# Patient Record
Sex: FEMALE | Race: WHITE | NOT HISPANIC OR LATINO | Employment: FULL TIME | URBAN - METROPOLITAN AREA
[De-identification: names, ages, dates, MRNs, and addresses within clinical notes are randomized per-mention and may not be internally consistent; named-entity substitution may affect disease eponyms.]

---

## 2019-11-30 ENCOUNTER — OFFICE VISIT (OUTPATIENT)
Dept: URGENT CARE | Facility: URGENT CARE | Age: 29
End: 2019-11-30
Payer: COMMERCIAL

## 2019-11-30 VITALS
OXYGEN SATURATION: 100 % | HEART RATE: 81 BPM | TEMPERATURE: 98.3 F | SYSTOLIC BLOOD PRESSURE: 138 MMHG | DIASTOLIC BLOOD PRESSURE: 60 MMHG | WEIGHT: 130 LBS

## 2019-11-30 DIAGNOSIS — Z32.01 PREGNANCY TEST POSITIVE: Primary | ICD-10-CM

## 2019-11-30 PROCEDURE — 99207 ZZC NO BILLABLE SERVICE THIS VISIT: CPT | Performed by: FAMILY MEDICINE

## 2019-11-30 SDOH — HEALTH STABILITY: MENTAL HEALTH: HOW MANY STANDARD DRINKS CONTAINING ALCOHOL DO YOU HAVE ON A TYPICAL DAY?: 1 OR 2

## 2019-11-30 SDOH — HEALTH STABILITY: MENTAL HEALTH: HOW OFTEN DO YOU HAVE 6 OR MORE DRINKS ON ONE OCCASION?: NEVER

## 2019-11-30 SDOH — HEALTH STABILITY: MENTAL HEALTH: HOW OFTEN DO YOU HAVE A DRINK CONTAINING ALCOHOL?: 2-4 TIMES A MONTH

## 2019-11-30 NOTE — PROGRESS NOTES
S:  Pt has had 3 pregnancy tests which were positive at home earlier today.    Initially she wanted a quantitative serum pregnancy to know how far along she is.  However she is planning to see her OB/GYN specialst next week.  She is unclear about her period.  Lat normal menstrual period was Aug 15, 2019.    In Sept, 2019 pt had a 3-day period which was late.  In Oct 2019, pt had 1 day of period which was 2 weeks late.  Yesterday, pt had 3 positive pregnancy test.  She has some nausea but no vomiting.    O: /60, WA 81, T 98.3F, O2 sat 100%  Pt looks well.  She declined exam    A:  Confirmed pregnancy with home pregnancy tests  P:  Pt would like to wait to see OB/GYN next week for ultrasound to determine her gestation.

## 2019-12-02 ENCOUNTER — TELEPHONE (OUTPATIENT)
Dept: URGENT CARE | Facility: URGENT CARE | Age: 29
End: 2019-12-02

## 2019-12-02 NOTE — TELEPHONE ENCOUNTER
Reason for Call:  Other call back    Detailed comments: Patient calling requesting an order for an ultrasound. She was seen in urgent care and would like to have an order placed for this. I gave her the main Ob Gyn scheduling number to call and set up a follow up appointment with Ob gyn to establish with a provider. She would like to see if an ultrasound can be ordered in the mean time until she can be seen. Please call her back and okay to leave detailed vm.    Phone Number Patient can be reached at: Home number on file [phone] (home)    Best Time: any    Can we leave a detailed message on this number? YES    Call taken on 12/2/2019 at 8:31 AM by Caroline Harrison

## 2019-12-02 NOTE — TELEPHONE ENCOUNTER
I called and left a detail VM for pt regarding to her request. Pt needs to make an appt with the OB/GYN for her first initial OB visit and they are the one to order the ultrasound. If patient do call back pt, please relay msg to pt. She will need to make an appt with OB/GYN, we do not put in orders ultrasound.    SANDRA Cano on 12/2/2019 at 1:21 PM

## 2024-02-27 ENCOUNTER — VIRTUAL VISIT (OUTPATIENT)
Dept: OBGYN | Facility: CLINIC | Age: 34
End: 2024-02-27
Payer: COMMERCIAL

## 2024-02-27 DIAGNOSIS — Z34.80 PRENATAL CARE OF MULTIGRAVIDA, ANTEPARTUM: Primary | ICD-10-CM

## 2024-02-27 PROCEDURE — 99207 PR NO CHARGE NURSE ONLY: CPT | Mod: 93

## 2024-02-27 RX ORDER — MULTIVITAMIN WITH IRON
1 TABLET ORAL DAILY
COMMUNITY

## 2024-02-27 NOTE — PROGRESS NOTES
Denied need for review of teaching  [facility] OB Intake Nurse    Patient supplied answers from flow sheet for:  Prenatal OB Questionnaire.  Past Medical History  Have you ever recieved care for your mental health? : No  Have you ever been in a major accident or suffered serious trauma?: No  Within the last year, has anyone hit, slapped, kicked or otherwise hurt you?: No  In the last year, has anyone forced you to have sex when you didn't want to?: No    Past Medical History 2   Have you ever received a blood transfusion?: No  Would you accept a blood transfusion if was medically recommended?: Yes  Does anyone in your home smoke?: No   Is your blood type Rh negative?: Unknown  Have you ever ?: No  Have you been hospitalized for a nonsurgical reason excluding normal delivery?: (!) Yes (in 2020 after delivery for preeclampsia)  Have you ever had an abnormal pap smear?: No    Past Medical History (Continued)  Do you have a history of abnormalities of the uterus?: No  Did your mother take CONNIE or any other hormones when she was pregnant with you?: Unknown  Do you have any other problems we have not asked about which you feel may be important to this pregnancy?: No

## 2024-02-27 NOTE — PATIENT INSTRUCTIONS
Learning About Pregnancy  Your Care Instructions     Your health in the early weeks of your pregnancy is particularly important for your baby's health. Take good care of yourself. Anything you do that harms your body can also harm your baby.  Make sure to go to all of your doctor appointments. Regular checkups will help keep you and your baby healthy.  How can you care for yourself at home?  Diet    Eat a balanced diet. Make sure your diet includes plenty of beans, peas, and leafy green vegetables.     Do not skip meals or go for many hours without eating. If you are nauseated, try to eat a small, healthy snack every 2 to 3 hours.     Do not eat fish that has a high level of mercury, such as shark, swordfish, or mackerel. Do not eat more than one can of tuna each week.     Drink plenty of fluids. If you have kidney, heart, or liver disease and have to limit fluids, talk with your doctor before you increase the amount of fluids you drink.     Cut down on caffeine, such as coffee, tea, and cola.     Do not drink alcohol, such as beer, wine, or hard liquor.     Take a multivitamin that contains at least 400 micrograms (mcg) of folic acid to help prevent birth defects. Fortified cereal and whole wheat bread are good additional sources of folic acid.     Increase the calcium in your diet. Try to drink a quart of skim milk each day. You may also take calcium supplements and choose foods such as cheese and yogurt.   Lifestyle    Make sure you go to your follow-up appointments.     Get plenty of rest. You may be unusually tired while you are pregnant.     Get at least 30 minutes of exercise on most days of the week. Walking is a good choice. If you have not exercised in the past, start out slowly. Take several short walks each day.     Do not smoke. If you need help quitting, talk to your doctor about stop-smoking programs. These can increase your chances of quitting for good.     Do not touch cat feces or litter boxes.  Also, wash your hands after you handle raw meat, and fully cook all meat before you eat it. Wear gloves when you work in the yard or garden, and wash your hands well when you are done. Cat feces, raw or undercooked meat, and contaminated dirt can cause an infection that may harm your baby or lead to a miscarriage.     Avoid things that can make your body too hot and may be harmful to your baby, such as a hot tub or sauna. Or talk with your doctor before doing anything that raises your body temperature. Your doctor can tell you if it's safe.     Avoid chemical fumes, paint fumes, or poisons.     Do not use illegal drugs, marijuana, or alcohol.   Medicines    Review all of your medicines with your doctor. Some of your routine medicines may need to be changed to protect your baby.     Use acetaminophen (Tylenol) to relieve minor problems, such as a mild headache or backache or a mild fever with cold symptoms. Do not use nonsteroidal anti-inflammatory drugs (NSAIDs), such as ibuprofen (Advil, Motrin) or naproxen (Aleve), unless your doctor says it is okay.     Do not take two or more pain medicines at the same time unless the doctor told you to. Many pain medicines have acetaminophen, which is Tylenol. Too much acetaminophen (Tylenol) can be harmful.     Take your medicines exactly as prescribed. Call your doctor if you think you are having a problem with your medicine.   To manage morning sickness    If you feel sick when you first wake up, try eating a small snack (such as crackers) before you get out of bed. Allow some time to digest the snack, and then get out of bed slowly.     Do not skip meals or go for long periods without eating. An empty stomach can make nausea worse.     Eat small, frequent meals instead of three large meals each day.     Drink plenty of fluids.     Eat foods that are high in protein but low in fat.     If you are taking iron supplements, ask your doctor if they are necessary. Iron can make  "nausea worse.     Avoid any smells, such as coffee, that make you feel sick.     Get lots of rest. Morning sickness may be worse when you are tired.   Follow-up care is a key part of your treatment and safety. Be sure to make and go to all appointments, and call your doctor if you are having problems. It's also a good idea to know your test results and keep a list of the medicines you take.  Where can you learn more?  Go to https://www.Vortal.net/patiented  Enter E868 in the search box to learn more about \"Learning About Pregnancy.\"  Current as of: July 11, 2023               Content Version: 13.8    8814-4686 [employer].   Care instructions adapted under license by your healthcare professional. If you have questions about a medical condition or this instruction, always ask your healthcare professional. [employer] disclaims any warranty or liability for your use of this information.      Weeks 6 to 10 of Your Pregnancy: Care Instructions  During these weeks of pregnancy, your body goes through many changes. You may start to feel different, both in your body and your emotions. Each pregnancy is different, so there's no \"right\" way to feel. These early weeks are a time to make healthy choices for you and your pregnancy.    Take a daily prenatal vitamin. Choose one with folic acid in it.   Avoid alcohol, tobacco, and drugs (including marijuana). If you need help quitting, talk to your doctor.     Drink plenty of liquids.  Be sure to drink enough water. And limit sodas, other sweetened drinks, and caffeine.     Choose foods that are good sources of calcium, iron, and folate.  You can try dairy products, dark leafy greens, fortified orange juice and cereals, almonds, broccoli, dried fruit, and beans.     Avoid foods that may be harmful.  Don't eat raw meat, deli meat, raw seafood, or raw eggs. Avoid soft cheese and unpasteurized dairy, like Brie and blue cheese. And don't eat fish that " "contains a lot of mercury, like shark and swordfish.     Don't touch luis litter or cat poop.  They can cause an infection that could be harmful during pregnancy.     Avoid things that can make your body too hot.  For example, avoid hot tubs and saunas.     Soothe morning sickness.  Try eating 5 or 6 small meals a day, getting some fresh air, or using urban to control symptoms.     Ask your doctor about flu and COVID-19 shots.  Getting them can help protect against infection.   Follow-up care is a key part of your treatment and safety. Be sure to make and go to all appointments, and call your doctor if you are having problems. It's also a good idea to know your test results and keep a list of the medicines you take.  Where can you learn more?  Go to https://www.CardiaLen.Search to Phone/patiented  Enter G112 in the search box to learn more about \"Weeks 6 to 10 of Your Pregnancy: Care Instructions.\"  Current as of: July 11, 2023               Content Version: 13.8 2006-2023 [employer].   Care instructions adapted under license by your healthcare professional. If you have questions about a medical condition or this instruction, always ask your healthcare professional. [employer] disclaims any warranty or liability for your use of this information.         Managing Morning Sickness (01:55)  Your health professional recommends that you watch this short online health video.  Learn tips for dealing with morning sickness, no matter what time of day you have it.  Purpose:  Gives tips for managing morning sickness, including eating small low-fat meals and avoiding caffeine and spicy food.  Goal:  The user will learn tips for dealing with morning sickness during pregnancy.     How to watch the video    Scan the QR code   OR Visit the website    https://link.CardiaLen.Search to Phone/r/Pspravz3bopyw   Current as of: July 11, 2023               Content Version: 13.8 2006-2023 [employer].   Care " instructions adapted under license by your healthcare professional. If you have questions about a medical condition or this instruction, always ask your healthcare professional. [employer] disclaims any warranty or liability for your use of this information.      Pregnancy and Heartburn: Care Instructions  Overview     Heartburn is a common problem during pregnancy.  Heartburn happens when stomach acid backs up into the tube that carries food to the stomach. This tube is called the esophagus. Early in pregnancy, heartburn is caused by hormone changes that slow down digestion. Later on, it's also caused by the large uterus pushing up on the stomach.  Even though you can't fix the cause, there are things you can do to get relief. Treating heartburn during pregnancy focuses first on making lifestyle changes, like changing what and how you eat, and on taking medicines.  Heartburn usually improves or goes away after childbirth.  Follow-up care is a key part of your treatment and safety. Be sure to make and go to all appointments, and call your doctor if you are having problems. It's also a good idea to know your test results and keep a list of the medicines you take.  How can you care for yourself at home?  Eat small, frequent meals.  Avoid foods that make your symptoms worse, such as chocolate, peppermint, and spicy foods. Avoid drinks with caffeine, such as coffee, tea, and sodas.  Avoid bending over or lying down after meals.  Take a short walk after you eat.  If heartburn is a problem at night, do not eat for 2 hours before bedtime.  Take antacids like Mylanta, Maalox, Rolaids, or Tums. Do not take antacids that have sodium bicarbonate, magnesium trisilicate, or aspirin. Be careful when you take over-the-counter antacid medicines. Many of these medicines have aspirin in them. While you are pregnant, do not take aspirin or medicines that contain aspirin unless your doctor says it is okay.  If you're not  "getting relief, talk to your doctor. You may be able to take a stronger acid-reducing medicine.  When should you call for help?   Call your doctor now or seek immediate medical care if:    You have new or worse belly pain.     You are vomiting.   Watch closely for changes in your health, and be sure to contact your doctor if:    You have new or worse symptoms of reflux.     You are losing weight.     You have trouble or pain swallowing.     You do not get better as expected.   Where can you learn more?  Go to https://www.[employer].net/patiented  Enter U946 in the search box to learn more about \"Pregnancy and Heartburn: Care Instructions.\"  Current as of: July 11, 2023               Content Version: 13.8    5808-4526 [employer].   Care instructions adapted under license by your healthcare professional. If you have questions about a medical condition or this instruction, always ask your healthcare professional. [employer] disclaims any warranty or liability for your use of this information.      Constipation: Care Instructions  Overview     Constipation means that you have a hard time passing stools (bowel movements). People pass stools from 3 times a day to once every 3 days. What is normal for you may be different. Constipation may occur with pain in the rectum and cramping. The pain may get worse when you try to pass stools. Sometimes there are small amounts of bright red blood on toilet paper or the surface of stools. This is because of enlarged veins near the rectum (hemorrhoids).  A few changes in your diet and lifestyle may help you avoid ongoing constipation. Your doctor may also prescribe medicine to help loosen your stool.  Some medicines can cause constipation. These include pain medicines and antidepressants. Tell your doctor about all the medicines you take. Your doctor may want to make a medicine change to ease your symptoms.  Follow-up care is a key part of your treatment " "and safety. Be sure to make and go to all appointments, and call your doctor if you are having problems. It's also a good idea to know your test results and keep a list of the medicines you take.  How can you care for yourself at home?  Drink plenty of fluids. If you have kidney, heart, or liver disease and have to limit fluids, talk with your doctor before you increase the amount of fluids you drink.  Include high-fiber foods in your diet each day. These include fruits, vegetables, beans, and whole grains.  Get at least 30 minutes of exercise on most days of the week. Walking is a good choice. You also may want to do other activities, such as running, swimming, cycling, or playing tennis or team sports.  Take a fiber supplement, such as Citrucel or Metamucil, every day. Read and follow all instructions on the label.  Schedule time each day for a bowel movement. A daily routine may help. Take your time having a bowel movement, but don't sit for more than 10 minutes at a time. And don't strain too much.  Support your feet with a small step stool when you sit on the toilet. This helps flex your hips and places your pelvis in a squatting position.  Your doctor may recommend an over-the-counter laxative to relieve your constipation. Examples are Milk of Magnesia and MiraLax. Read and follow all instructions on the label. Do not use laxatives on a long-term basis.  When should you call for help?   Call your doctor now or seek immediate medical care if:    You have new or worse belly pain.     You have new or worse nausea or vomiting.     You have blood in your stools.   Watch closely for changes in your health, and be sure to contact your doctor if:    Your constipation is getting worse.     You do not get better as expected.   Where can you learn more?  Go to https://www.healthwise.net/patiented  Enter P343 in the search box to learn more about \"Constipation: Care Instructions.\"  Current as of: March 21, " 2023               Content Version: 13.8    8337-4784 [employer].   Care instructions adapted under license by your healthcare professional. If you have questions about a medical condition or this instruction, always ask your healthcare professional. [employer] disclaims any warranty or liability for your use of this information.      Nutrition During Pregnancy: Care Instructions  Overview     Healthy eating when you are pregnant is important for you and your baby. It can help you feel well and have a successful pregnancy and delivery. During pregnancy your nutrition needs increase. Even if you have excellent eating habits, your doctor may recommend a multivitamin to make sure you get enough iron and folic acid.  You may wonder how much weight you should gain. In general, if you were at a healthy weight before you became pregnant, then you should gain between 25 and 35 pounds. If you were overweight before pregnancy, then you'll likely be advised to gain 15 to 25 pounds. If you were underweight before pregnancy, then you'll probably be advised to gain 28 to 40 pounds. Your doctor will work with you to set a weight goal that is right for you. Gaining a healthy amount of weight helps you have a healthy baby.  Follow-up care is a key part of your treatment and safety. Be sure to make and go to all appointments, and call your doctor if you are having problems. It's also a good idea to know your test results and keep a list of the medicines you take.  How can you care for yourself at home?  Eat plenty of fruits and vegetables. Include a variety of orange, yellow, and leafy dark-green vegetables every day.  Choose whole-grain bread, cereal, and pasta. Good choices include whole wheat bread, whole wheat pasta, brown rice, and oatmeal.  Get 4 or more servings of milk and milk products each day. Good choices include nonfat or low-fat milk, yogurt, and cheese. If you cannot eat milk products, you  "can get calcium from calcium-fortified products such as orange juice, soy milk, and tofu. Other non-milk sources of calcium include leafy green vegetables, such as broccoli, kale, mustard greens, turnip greens, bok leila, and brussels sprouts.  If you eat meat, pick lower-fat types. Good choices include lean cuts of meat and chicken or turkey without the skin.  Do not eat shark, swordfish, hong mackerel, or tilefish. They have high levels of mercury, which is dangerous to your baby. You can eat up to 12 ounces a week of fish or shellfish that have low mercury levels. Good choices include shrimp, wild salmon, pollock, and catfish. Limit some other types of fish, such as white (albacore) tuna, to 4 oz (0.1 kg) a week.  Heat lunch meats (such as turkey, ham, or bologna) to 165 F before you eat them. This reduces your risk of getting sick from a kind of bacteria that can be found in lunch meats.  Do not eat unpasteurized soft cheeses, such as brie, feta, fresh mozzarella, and blue cheese. They have a bacteria that could harm your baby.  Limit caffeine. If you drink coffee or tea, have no more than 1 cup a day. Caffeine is also found in nikunj.  Do not drink any alcohol. No amount of alcohol has been found to be safe during pregnancy.  Do not diet or try to lose weight. For example, do not follow a low-carbohydrate diet. If you are overweight at the start of your pregnancy, your doctor will work with you to manage your weight gain.  Tell your doctor about all vitamins and supplements you take.  When should you call for help?  Watch closely for changes in your health, and be sure to contact your doctor if you have any problems.  Where can you learn more?  Go to https://www.[employer].net/patiented  Enter Y785 in the search box to learn more about \"Nutrition During Pregnancy: Care Instructions.\"  Current as of: February 28, 2023               Content Version: 13.8    1661-4895 [employer].   Care instructions " adapted under license by your healthcare professional. If you have questions about a medical condition or this instruction, always ask your healthcare professional. [employer] disclaims any warranty or liability for your use of this information.      Exercise During Pregnancy: Care Instructions  Overview     Exercise is good for you during a healthy pregnancy. It can relieve back pain, swelling, and other discomforts. It also prepares your muscles for childbirth. And exercise can improve your energy level and help you sleep better.  If your doctor advises it, get more exercise. For example, walking is a good choice. Bit by bit, increase the amount you walk every day. Try for at least 30 minutes on most days of the week. You could also try a prenatal exercise class. But if you do not already exercise, be sure to talk with your doctor before you start a new exercise program. Doctors do not recommend contact sports during pregnancy.  Follow-up care is a key part of your treatment and safety. Be sure to make and go to all appointments, and call your doctor if you are having problems. It's also a good idea to know your test results and keep a list of the medicines you take.  How can you care for yourself at home?  Talk with your doctor about the right kind of exercise for each stage of pregnancy.  Listen to your body to know if your exercise is at a safe level.  Do not become overheated while you exercise. High body temperature can be harmful. Avoid activities that can make your body too hot.  If you feel tired, take it easy. You might walk instead of run.  If you are used to strenuous exercise, ask your doctor how to know when it's time to slow down.  If you exercised before getting pregnant, you should be able to keep up your routine early in your pregnancy. Later in your pregnancy, you may want to switch to more gentle activities.  Drink plenty of fluids before, during, and after exercise.  Avoid contact  "sports, such as soccer and basketball. Also avoid risky activities. These include scuba diving, horseback riding, and exercising at a high altitude (above 6,000 feet). If you live in a place with a high altitude, talk to your doctor about how you can exercise safely.  Do not get overtired while you exercise. You should be able to talk while you work out.  After your fourth month of pregnancy, avoid exercises that require you to lie flat on your back on a hard surface. These include sit-ups and some yoga poses.  Get plenty of rest. You may be very tired while you are pregnant.  Where can you learn more?  Go to https://www.Epic!.net/patiented  Enter S801 in the search box to learn more about \"Exercise During Pregnancy: Care Instructions.\"  Current as of: July 11, 2023               Content Version: 13.8    9205-6958 [employer].   Care instructions adapted under license by your healthcare professional. If you have questions about a medical condition or this instruction, always ask your healthcare professional. [employer] disclaims any warranty or liability for your use of this information.      "

## 2024-03-28 LAB
ABO/RH(D): NORMAL
ANTIBODY SCREEN: NEGATIVE
SPECIMEN EXPIRATION DATE: NORMAL

## 2024-03-29 ENCOUNTER — PRENATAL OFFICE VISIT (OUTPATIENT)
Dept: OBGYN | Facility: CLINIC | Age: 34
End: 2024-03-29
Payer: COMMERCIAL

## 2024-03-29 VITALS
BODY MASS INDEX: 24.88 KG/M2 | WEIGHT: 140.4 LBS | HEIGHT: 63 IN | TEMPERATURE: 98.3 F | RESPIRATION RATE: 16 BRPM | DIASTOLIC BLOOD PRESSURE: 71 MMHG | HEART RATE: 76 BPM | SYSTOLIC BLOOD PRESSURE: 109 MMHG

## 2024-03-29 DIAGNOSIS — Z34.80 PRENATAL CARE OF MULTIGRAVIDA, ANTEPARTUM: Primary | ICD-10-CM

## 2024-03-29 DIAGNOSIS — Z12.4 CERVICAL CANCER SCREENING: ICD-10-CM

## 2024-03-29 DIAGNOSIS — O09.299 HX OF PRE-ECLAMPSIA IN PRIOR PREGNANCY, CURRENTLY PREGNANT: ICD-10-CM

## 2024-03-29 LAB
ALBUMIN MFR UR ELPH: 6.6 MG/DL
ALBUMIN UR-MCNC: NEGATIVE MG/DL
ALT SERPL W P-5'-P-CCNC: 11 U/L (ref 0–50)
APPEARANCE UR: CLEAR
AST SERPL W P-5'-P-CCNC: 11 U/L (ref 0–45)
BILIRUB UR QL STRIP: NEGATIVE
COLOR UR AUTO: YELLOW
CREAT SERPL-MCNC: 0.57 MG/DL (ref 0.51–0.95)
CREAT UR-MCNC: 110.3 MG/DL
EGFRCR SERPLBLD CKD-EPI 2021: >90 ML/MIN/1.73M2
ERYTHROCYTE [DISTWIDTH] IN BLOOD BY AUTOMATED COUNT: 13.1 % (ref 10–15)
GLUCOSE UR STRIP-MCNC: NEGATIVE MG/DL
HBV CORE AB SERPL QL IA: NONREACTIVE
HBV SURFACE AB SERPL IA-ACNC: >1000 M[IU]/ML
HBV SURFACE AB SERPL IA-ACNC: REACTIVE M[IU]/ML
HBV SURFACE AG SERPL QL IA: NONREACTIVE
HCT VFR BLD AUTO: 40.3 % (ref 35–47)
HCV AB SERPL QL IA: NONREACTIVE
HGB BLD-MCNC: 13.6 G/DL (ref 11.7–15.7)
HGB UR QL STRIP: NEGATIVE
HIV 1+2 AB+HIV1 P24 AG SERPL QL IA: NONREACTIVE
KETONES UR STRIP-MCNC: NEGATIVE MG/DL
LEUKOCYTE ESTERASE UR QL STRIP: NEGATIVE
MCH RBC QN AUTO: 29.2 PG (ref 26.5–33)
MCHC RBC AUTO-ENTMCNC: 33.7 G/DL (ref 31.5–36.5)
MCV RBC AUTO: 87 FL (ref 78–100)
NITRATE UR QL: NEGATIVE
PH UR STRIP: 6.5 [PH] (ref 5–7)
PLATELET # BLD AUTO: 155 10E3/UL (ref 150–450)
PROT/CREAT 24H UR: 0.06 MG/MG CR (ref 0–0.2)
RBC # BLD AUTO: 4.66 10E6/UL (ref 3.8–5.2)
RBC #/AREA URNS AUTO: NORMAL /HPF
RUBV IGG SERPL QL IA: 14.3 INDEX
RUBV IGG SERPL QL IA: POSITIVE
SP GR UR STRIP: 1.02 (ref 1–1.03)
T PALLIDUM AB SER QL: NONREACTIVE
UROBILINOGEN UR STRIP-ACNC: 0.2 E.U./DL
WBC # BLD AUTO: 4.7 10E3/UL (ref 4–11)
WBC #/AREA URNS AUTO: NORMAL /HPF

## 2024-03-29 PROCEDURE — 85027 COMPLETE CBC AUTOMATED: CPT | Performed by: OBSTETRICS & GYNECOLOGY

## 2024-03-29 PROCEDURE — 87624 HPV HI-RISK TYP POOLED RSLT: CPT | Performed by: OBSTETRICS & GYNECOLOGY

## 2024-03-29 PROCEDURE — 86901 BLOOD TYPING SEROLOGIC RH(D): CPT | Performed by: OBSTETRICS & GYNECOLOGY

## 2024-03-29 PROCEDURE — 87086 URINE CULTURE/COLONY COUNT: CPT | Performed by: OBSTETRICS & GYNECOLOGY

## 2024-03-29 PROCEDURE — 87389 HIV-1 AG W/HIV-1&-2 AB AG IA: CPT | Performed by: OBSTETRICS & GYNECOLOGY

## 2024-03-29 PROCEDURE — 84450 TRANSFERASE (AST) (SGOT): CPT | Performed by: OBSTETRICS & GYNECOLOGY

## 2024-03-29 PROCEDURE — 86780 TREPONEMA PALLIDUM: CPT | Performed by: OBSTETRICS & GYNECOLOGY

## 2024-03-29 PROCEDURE — 86900 BLOOD TYPING SEROLOGIC ABO: CPT | Performed by: OBSTETRICS & GYNECOLOGY

## 2024-03-29 PROCEDURE — 87491 CHLMYD TRACH DNA AMP PROBE: CPT | Performed by: OBSTETRICS & GYNECOLOGY

## 2024-03-29 PROCEDURE — 87591 N.GONORRHOEAE DNA AMP PROB: CPT | Performed by: OBSTETRICS & GYNECOLOGY

## 2024-03-29 PROCEDURE — 99459 PELVIC EXAMINATION: CPT | Performed by: OBSTETRICS & GYNECOLOGY

## 2024-03-29 PROCEDURE — 84460 ALANINE AMINO (ALT) (SGPT): CPT | Performed by: OBSTETRICS & GYNECOLOGY

## 2024-03-29 PROCEDURE — 99203 OFFICE O/P NEW LOW 30 MIN: CPT | Mod: 25 | Performed by: OBSTETRICS & GYNECOLOGY

## 2024-03-29 PROCEDURE — 86762 RUBELLA ANTIBODY: CPT | Performed by: OBSTETRICS & GYNECOLOGY

## 2024-03-29 PROCEDURE — 81001 URINALYSIS AUTO W/SCOPE: CPT | Performed by: OBSTETRICS & GYNECOLOGY

## 2024-03-29 PROCEDURE — G0145 SCR C/V CYTO,THINLAYER,RESCR: HCPCS | Performed by: OBSTETRICS & GYNECOLOGY

## 2024-03-29 PROCEDURE — 86850 RBC ANTIBODY SCREEN: CPT | Performed by: OBSTETRICS & GYNECOLOGY

## 2024-03-29 PROCEDURE — 84156 ASSAY OF PROTEIN URINE: CPT | Performed by: OBSTETRICS & GYNECOLOGY

## 2024-03-29 PROCEDURE — 86706 HEP B SURFACE ANTIBODY: CPT | Performed by: OBSTETRICS & GYNECOLOGY

## 2024-03-29 PROCEDURE — 82565 ASSAY OF CREATININE: CPT | Performed by: OBSTETRICS & GYNECOLOGY

## 2024-03-29 PROCEDURE — 86704 HEP B CORE ANTIBODY TOTAL: CPT | Performed by: OBSTETRICS & GYNECOLOGY

## 2024-03-29 PROCEDURE — 76817 TRANSVAGINAL US OBSTETRIC: CPT | Performed by: OBSTETRICS & GYNECOLOGY

## 2024-03-29 PROCEDURE — 36415 COLL VENOUS BLD VENIPUNCTURE: CPT | Performed by: OBSTETRICS & GYNECOLOGY

## 2024-03-29 PROCEDURE — 86803 HEPATITIS C AB TEST: CPT | Performed by: OBSTETRICS & GYNECOLOGY

## 2024-03-29 PROCEDURE — 87340 HEPATITIS B SURFACE AG IA: CPT | Performed by: OBSTETRICS & GYNECOLOGY

## 2024-03-29 NOTE — PROGRESS NOTES
[facility]   OB/GYN Clinic    CC: New OB     Subjective:    Emily is a 33 year old  at 10w3d by Patient's last menstrual period was 2024. who presents for her initial OB visit. This is a planned pregnancy and her and her partner Eben are excited. She reports feeling well. Mild nausea and spotting.   Prior to a +UPT, she reports regular monthly periods without contraception use.       OB History    Para Term  AB Living   3 2 2 0 0 2   SAB IAB Ectopic Multiple Live Births   0 0 0 0 2      # Outcome Date GA Lbr Bong/2nd Weight Sex Delivery Anes PTL Lv   3 Current            2 Term 05/15/22 40w0d  3.742 kg (8 lb 4 oz) M    ELY      Birth Comments: mild shoulder dystocia      Complications: Shoulder Dystocia      Name: Kemal Andujar Term 20 41w0d  3.657 kg (8 lb 1 oz) M CS-Unspec   ELY      Birth Comments: preecalmpsia 3 days after delivery readmitted to hospital      Complications: Fetal Intolerance      Name: Felix       Past Medical History:   Diagnosis Date    Genital herpes     History of urinary tract infection     with both pregnancy    Kidney infection     last one 2022    Preeclampsia     after being home for 3 days after delivery- readmitted for 4 days       Past Surgical History:   Procedure Laterality Date     SECTION  2020       Current Outpatient Medications   Medication Sig Dispense Refill    Prenatal Vit-Fe Fumarate-FA (PRENATAL MULTIVITAMIN  PLUS IRON) 27-1 MG TABS Take 1 tablet by mouth daily      magnesium 250 MG tablet Take 1 tablet by mouth daily (Patient not taking: Reported on 3/29/2024)         Family History   Problem Relation Age of Onset    Aneurysm Mother     Depression Mother     Alcoholism Father     Hypertension Father     Alcoholism Sister         recovered    Congenital Anomalies Sister         born without a brain only lived a few minutes    Dementia Paternal Grandfather        Social History     Tobacco Use     "Smoking status: Never    Smokeless tobacco: Never   Substance Use Topics    Alcohol use: Not Currently     Comment: rare-quit with pregnancy       ROS: A 10 pt ROS was completed and found to be negative unless mentioned in the HPI.     Objective:  /71 (BP Location: Right arm, Patient Position: Chair, Cuff Size: Adult Regular)   Pulse 76   Temp 98.3  F (36.8  C) (Tympanic)   Resp 16   Ht 1.594 m (5' 2.75\")   Wt 63.7 kg (140 lb 6.4 oz)   LMP 2024   BMI 25.07 kg/m      Estimated body mass index is 25.07 kg/m  as calculated from the following:    Height as of this encounter: 1.594 m (5' 2.75\").    Weight as of this encounter: 63.7 kg (140 lb 6.4 oz).    Physical Exam:  Gen: Pleasant, talkative female in no apparent distress   Respiratory: Lungs clear, breathing comfortably on room air   Cardiac: Regular rate and rhythm with no murmurs, gallops or rubs. Warm and well-perfused.   GI: Abd soft and non-tender  : External genitalia is free of lesion. Urethra and bartholin glands normal.  Vaginal mucosa is moist and pink without unusual discharge.  Cervix is without lesions or discharge. Bimanual exam reveals mobile anteverted uterus without cervical motion tenderness.  Adenexa are mobile and non-tender bilaterally. No appreciable adnexal enlargement. Uterus is consistent with a 10 week gestation.   MSK: Grossly normal movement of all four extremities  Psych: mood and affect bright   Lower extremity: edema not present     Transvaginal US: montero IUP measuring 10w1d, +cardiac activity at 176 bpm, normal adnexa      Assessment/Plan:   33 year old  at 10w3d by LMP of Patient's last menstrual period was 2024. who presents for her initial OB visit.   1) Plan to draw new OB lab today (T&S, CBC, HIV, RPR, HepBsAg, Hep B antibody, rubella, GC/Chlam, UC)  2) Discussed routine prenatal care, group practice model at [facility], tertiary support and frequency of visits. Options for  " testing for chromosomal and birth defects were discussed with the patient including nuchal translucency/blood marker testing in the first trimester, progenity and quad screening. She would like NIPT with sex over MyChart   3) Plan for dating/viability scan now - CYNTHIA 10/22  4) Concerns: none  5) PMH/OBHx problems:    - hx of c/s for arrest of descent, failed vacuum and fetal intolerance, had successful  and plans to  again   - hx of mild shoulder dystocia: short, no injury to baby, wants to try  again   - hx of readmission for postpartum pre-e, no issues second baby: recommended baseline hELLP labs and ASA this pregnancy   - Hx of genital herpes: only 1x outbreak not in pregnancy, do not discuss in front of partner, recommended 36wk ppx   - UTI this pregnancy, plan repeat UA/UCx today   6) Medication review: no changes, continue prenatal vitamin   7) Reviewed ectopic and miscarriage precautions (present to ED or call clinic with abdominal pain, vaginal bleeding or fever)   8) Weight gain: discussed weight gain expectations (BMI 18.5-25: 25-35lbs)   9) PAP smear: collected today   10) Delivery plan: continue to discuss- planning , breastfeeding, pp contraception, pain management in labor - continue to discuss  11) Immunizations: flu shot in the fall, Tdap at 28wks, COVID in the fall   12) No increased risk for gestational diabetes for plan for screen at 28wks     Return to clinic in 4 weeks.     Bee Escalera MD  OB/GYN  3/29/2024

## 2024-03-29 NOTE — PATIENT INSTRUCTIONS
"Learning About High-Iron Foods  What foods are high in iron?     The foods you eat contain nutrients, such as vitamins and minerals. Iron is a nutrient. Your body needs the right amount to stay healthy and work as it should. You can use the list below to help you make choices about which foods to eat.  Here are some foods that contain iron. They have 1 to 2 milligrams of iron per serving.  Fruits  Figs (dried), 5 figs  Vegetables  Asparagus (canned), 6 farrar  Aguila, beet, Swiss chard, or turnip greens, 1 cup  Dried peas, cooked,   cup  Seaweed, spirulina (dried),   cup  Spinach, (cooked)   cup or (raw) 1 cup  Grains  Cereals, fortified with iron, 1 cup  Grits (instant, cooked), fortified with iron,   cup  Meats and other protein foods  Beans (kidney, lima, navy, white), canned or cooked,   cup  Beef or lamb, 3 oz  Chicken giblets, 3 oz  Chickpeas (garbanzo beans),   cup  Liver of beef, lamb, or pork, 3 oz  Oysters (cooked), 3 oz  Sardines (canned), 3 oz  Soybeans (boiled),   cup  Tofu (firm),   cup  Work with your doctor to find out how much of this nutrient you need. Depending on your health, you may need more or less of it in your diet.  Where can you learn more?  Go to https://www.[employer].net/patiented  Enter R005 in the search box to learn more about \"Learning About High-Iron Foods.\"  Current as of: September 20, 2023               Content Version: 14.0    9362-8750 [employer].   Care instructions adapted under license by your healthcare professional. If you have questions about a medical condition or this instruction, always ask your healthcare professional. [employer] disclaims any warranty or liability for your use of this information.      Rh Antibodies Screening During Pregnancy: About This Test  What is it?     The Rh antibodies screening test is a blood test. It checks your blood for Rh antibodies. If you have Rh-negative blood and have been exposed to Rh-positive blood, " "your immune system may make antibodies to attack the Rh-positive blood. When a pregnant woman has these antibodies, it is called Rh sensitization.  Why is this test done?  The Rh antibodies screening test is done during pregnancy to find out if your baby is at risk for Rh disease. This can happen if you have Rh-negative blood and your baby has Rh-positive blood. If your Rh-negative blood mixes with Rh-positive blood, your immune system will make antibodies to attack the Rh-positive blood.  During pregnancy, these antibodies could attach to the baby's red blood cells. This can cause your baby to have serious health problems. The results of this test will help your doctor know how to best care for you and your baby during your pregnancy.  How do you prepare for the test?  In general, there's nothing you have to do before this test, unless your doctor tells you to.  How is the test done?  A health professional uses a needle to take a blood sample, usually from the arm.  What happens after the test?  You will probably be able to go home right away. It depends on the reason for the test.  You can go back to your usual activities right away.  Follow-up care is a key part of your treatment and safety. Be sure to make and go to all appointments, and call your doctor if you are having problems. It's also a good idea to keep a list of the medicines you take. Ask your doctor when you can expect to have your test results.  Where can you learn more?  Go to https://www.[employer].net/patiented  Enter P722 in the search box to learn more about \"Rh Antibodies Screening During Pregnancy: About This Test.\"  Current as of: July 10, 2023               Content Version: 14.0    2276-0357 [employer].   Care instructions adapted under license by your healthcare professional. If you have questions about a medical condition or this instruction, always ask your healthcare professional. [employer] disclaims any " warranty or liability for your use of this information.      Learning About Preventing Rh Disease  What is Rh disease?     Rh disease can be a serious problem in pregnancy. It happens when substances called antibodies in the mother's blood cause red blood cells in her baby's blood to be destroyed. This can occur when the blood types of a mother and her baby do not match.  All blood has an Rh factor. This is what makes a blood type positive or negative. When you are Rh-negative, your baby may be Rh-negative or Rh-positive. If your baby has Rh-positive blood and it mixes with yours, your body will make antibodies. This is called Rh sensitization.  Most of the time, this is not a problem in a first pregnancy. But in future pregnancies, it could cause Rh disease.  A  with Rh disease has mild anemia and may have jaundice. In severe cases, anemia, jaundice, and swelling can be very dangerous or fatal. Some babies need to be delivered early. Some need special care in the NICU. A very sick baby will need a blood transfusion before or after birth.  Fortunately, Rh sensitization is usually easy to prevent.  That's why it's important to get your Rh status checked in your first trimester. It doesn't cause any warning signs. A blood test is the only way to know if you are Rh-sensitive or are at risk for it.  How can you prevent Rh disease?  If you are Rh-negative, your doctor gives you an Rh immune globulin shot (such as RhoGAM). It helps prevent your body from making the antibodies that attack your baby's red blood cells.  Timing is important. You need the shot at certain times during your pregnancy. And you need one anytime there is a chance that your baby's blood might mix with yours. That can happen with certain prenatal tests or when you have pregnancy bleeding, such as:  Right after any pregnancy loss, amniocentesis, or CVS testing.  After turning of a breech baby.  Before and maybe after childbirth. Your doctor  "gives you a shot around week 28. If your  is Rh-positive, you will have another shot.  Follow-up care is a key part of your treatment and safety. Be sure to make and go to all appointments, and call your doctor if you are having problems. It's also a good idea to know your test results and keep a list of the medicines you take.  Where can you learn more?  Go to https://www.[employer]oplocal.net/patiented  Enter W177 in the search box to learn more about \"Learning About Preventing Rh Disease.\"  Current as of: July 10, 2023               Content Version: 14.0    2872-0037 [employer].   Care instructions adapted under license by your healthcare professional. If you have questions about a medical condition or this instruction, always ask your healthcare professional. [employer] disclaims any warranty or liability for your use of this information.      Learning About Rh Immunoglobulin Shots  Introduction     An Rh immunoglobulin shot is given to pregnant women who have Rh-negative blood.  You may have Rh-negative blood, and your baby may have Rh-positive blood. If the two types of blood mix, your body will make antibodies. This is called Rh sensitization. Most of the time, this is not a problem the first time you're pregnant. But it could cause problems in future pregnancies.  This shot keeps your body from making the antibodies. You get the shot around 28 weeks of pregnancy. After the birth, your baby's blood is tested. If the blood is Rh positive, you will get another shot. You may also get the shot if you have vaginal bleeding while you are pregnant or if you have a miscarriage. These shots protect future pregnancies.  Women with Rh negative blood will need this shot each time they get pregnant.  Example  Rh immunoglobulin (HypRho-D, MICRhoGAM, and RhoGAM)  Possible side effects  Rare side effects may include:  Some mild pain where you got the shot.  A slight fever.  An allergic " "reaction.  You may have other side effects not listed here. Check the information that comes with your medicine.  What to know about taking this medicine  You may need more than one shot. You may need the shot again:  After amniocentesis, fetal blood sampling, or chorionic villus sampling tests.  If you have bleeding in your second or third trimester.  After turning of a breech baby.  After an injury to the belly while you are pregnant.  After a miscarriage or an .  Before or right after treatment for an ectopic or a partial molar pregnancy.  Tell your doctor if you have any allergies or have had a bad response to medicines in the past.  If you get this shot within 3 months of getting a live-virus vaccine, the vaccine may not work. Your doctor will tell you if you need more vaccine.  Check with your doctor or pharmacist before you use any other medicines. This includes over-the-counter medicines. Make sure your doctor knows all of the medicines, vitamins, herbs, and supplements you take. Taking some medicines at the same time can cause problems.  Where can you learn more?  Go to https://www.[employer]iberLight.net/patiented  Enter V615 in the search box to learn more about \"Learning About Rh Immunoglobulin Shots.\"  Current as of: July 10, 2023               Content Version: 14.0    7825-1147 [employer].   Care instructions adapted under license by your healthcare professional. If you have questions about a medical condition or this instruction, always ask your healthcare professional. [employer] disclaims any warranty or liability for your use of this information.      Rubella (T[place] Measles): Care Instructions  Overview  Rubella, also called [place] measles or 3-day measles, is a disease caused by a virus. It spreads by coughs, sneezes, and close contact. Rubella usually is mild and does not cause long-term problems. But if you are pregnant and get it, you can give the disease to your " unborn baby. This can cause serious birth defects.  While you have rubella, you may get a rash and a mild fever, and the lymph glands in your neck may swell. Older children often have a fever, eye pain, a sore throat, and body aches. You can relieve most symptoms with care at home. Avoid being around others, especially pregnant people, until your rash has been gone for at least 4 days. People who have not had this disease before or have not had the vaccine have the greatest chance of getting the virus.  Follow-up care is a key part of your treatment and safety. Be sure to make and go to all appointments, and call your doctor if you are having problems. It's also a good idea to know your test results and keep a list of the medicines you take.  How can you care for yourself at home?  Drink plenty of fluids. If you have kidney, heart, or liver disease and have to limit fluids, talk with your doctor before you increase the amount of fluids you drink.  Get plenty of rest to help your body heal.  Take an over-the-counter pain medicine, such as acetaminophen (Tylenol), ibuprofen (Advil, Motrin), or naproxen (Aleve), to reduce fever and discomfort. Read and follow all instructions on the label. Do not give aspirin to anyone younger than 20. It has been linked to Reye syndrome, a serious illness.  Do not take two or more pain medicines at the same time unless the doctor told you to. Many pain medicines have acetaminophen, which is Tylenol. Too much acetaminophen (Tylenol) can be harmful.  Try not to scratch the rash. Put cold, wet cloths on the rash to reduce itching.  Do not smoke. Smoking can make your symptoms worse. If you need help quitting, talk to your doctor about stop-smoking programs and medicines. These can increase your chances of quitting for good.  Avoid contact with people who have never had rubella and who have not been immunized.  When should you call for help?   Call your doctor now or seek immediate  "medical care if:    You have a fever with a stiff neck or a severe headache.     You are sensitive to light or feel very sleepy or confused.   Watch closely for changes in your health, and be sure to contact your doctor if:    You do not get better as expected.   Where can you learn more?  Go to https://www.[employer].net/patiented  Enter B812 in the search box to learn more about \"Rubella (Stateless Measles): Care Instructions.\"  Current as of: 2023               Content Version: 14.0    5334-3841 [employer].   Care instructions adapted under license by your healthcare professional. If you have questions about a medical condition or this instruction, always ask your healthcare professional. [employer] disclaims any warranty or liability for your use of this information.      Gonorrhea and Chlamydia: About These Tests  What is it?  These tests use a sample of urine or other body fluid to look for the bacteria that cause these sexually transmitted infections (STIs). The fluid sample can come from the cervix, vagina, rectum, throat, or eyes.  Why is this test done?  These tests may be done to:  Find out if symptoms are caused by gonorrhea or chlamydia.  Check people who are at high risk of being infected with gonorrhea or chlamydia.  Retest people several months after they have been treated for gonorrhea or chlamydia.  Check for infection in your  if you had a gonorrhea or chlamydia infection at the time of delivery.  How can you prepare for the test?  If you are going to have a urine test, do not urinate for at least 1 hour before the test.  If you think you may have chlamydia or gonorrhea, don't have sexual intercourse until you get your test results. And you may want to have tests for other STIs, such as HIV.  How is the test done?  For a direct sample, a swab is used to collect body fluid from the cervix, vagina, rectum, throat, or eyes. Your doctor may collect the sample. " "Or you may be given instructions on how to collect your own sample.  For a urine sample, you will collect the urine that comes out when you first start to urinate. Don't wipe the genital area clean before you urinate.  How long does the test take?  The test will take a few minutes.  What happens after the test?  You will be able to go home right away.  You can go back to your usual activities right away.  If you do have an infection, don't have sexual intercourse for 7 days after you start treatment. And your sex partner(s) should also be treated.  Follow-up care is a key part of your treatment and safety. Be sure to make and go to all appointments, and call your doctor if you are having problems. It's also a good idea to keep a list of the medicines you take. Ask your doctor when you can expect to have your test results.  Where can you learn more?  Go to https://[email or web address]/patiented  Enter K976 in the search box to learn more about \"Gonorrhea and Chlamydia: About These Tests.\"  Current as of: November 27, 2023               Content Version: 14.0    5860-6707 [employer].   Care instructions adapted under license by your healthcare professional. If you have questions about a medical condition or this instruction, always ask your healthcare professional. [employer] disclaims any warranty or liability for your use of this information.      Trichomoniasis: About This Test  What is it?     This test uses a sample of urine or other body fluid to look for the tiny parasite that causes trichomoniasis (also called trich). The fluid sample can come from the vagina, cervix, or urethra. Your doctor may choose to use one or more of many available tests.  Why is it done?  A trich test may be done to:  Find out if symptoms are caused by trich.  Check people who are at high risk for being infected with trich.  Check after treatment to make sure that the infection is gone.  How do you prepare for " the test?  If you are going to have a urine test, do not urinate for at least 1 hour before the test.  How is the test done?  For a direct sample, a swab is used to collect body fluid from the cervix, vagina, or urethra. Your doctor may collect the sample. Or you may be given instructions on how to collect your own sample.  For a urine sample, you will collect the urine that comes out when you first start to urinate. Don't wipe the area clean before you urinate.  How long does the test take?  It will take a few minutes to collect a sample.  What happens after the test?  You can go home right away.  You can go back to your usual activities right away.  You may get the test results the same day or several days later. It depends on the test used.  If you do have an infection, don't have sexual intercourse for 7 days after you start treatment. Your sex partner or partners should also be treated.  Follow-up care is a key part of your treatment and safety. Be sure to make and go to all appointments, and call your doctor if you are having problems. Ask your doctor when you can expect to have your test results.  Current as of: November 27, 2023               Content Version: 14.0    6531-1904 [employer].   Care instructions adapted under license by your healthcare professional. If you have questions about a medical condition or this instruction, always ask your healthcare professional. [employer] disclaims any warranty or liability for your use of this information.      HIV Testing: Care Instructions  Overview  You can get tested for the human immunodeficiency virus (HIV). Most doctors use a blood test to check for HIV antibodies and antigens in your blood. It may also check for the genetic material (RNA) of HIV. Some tests use saliva to check for HIV antibodies. But these aren't as accurate. For example, they may give a false result if you've just been infected.  What do the results mean?     "Normal (negative)    No HIV antibodies, antigens, or RNA were found.  You may need more testing. It can make sure your test results are correct.    Uncertain (indeterminate)    Test results didn't clearly show if you have an HIV infection.  HIV antibodies or antigens may not have formed yet.  Some other type of antibody or antigen may have affected the results.  You will need another test to be sure.    Abnormal (positive)    HIV antibodies, antigens, or RNA were found.  If you haven't had an RNA test yet, one will be done. If it's positive, you have HIV.  If your test result is positive, your doctor will talk to you. You will discuss starting treatment.  Follow-up care is a key part of your treatment and safety. Be sure to make and go to all appointments, and call your doctor if you are having problems. It's also a good idea to know your test results and keep a list of the medicines you take.  Where can you learn more?  Go to https://www.[employer].net/patiented  Enter T792 in the search box to learn more about \"HIV Testing: Care Instructions.\"  Current as of: June 12, 2023               Content Version: 14.0    3028-4432 [employer].   Care instructions adapted under license by your healthcare professional. If you have questions about a medical condition or this instruction, always ask your healthcare professional. [employer] disclaims any warranty or liability for your use of this information.      Hepatitis C Virus Tests: About These Tests  What are they?     Hepatitis C virus tests are blood tests that check for substances in the blood that show whether you have hepatitis C now or had it in the past. The tests can also tell you what type of hepatitis C you have and how severe the disease is. This can help your doctor with treatment.  If the tests show that you have long-term hepatitis C, you need to take steps to prevent spreading the disease.  Why are these tests done?  You may need " "these tests if:  You have symptoms of hepatitis.  You may have been exposed to the virus. You are more likely to have been exposed to the virus if you inject drugs or are exposed to body fluids (such as if you are a health care worker).  You've had other tests that show you have liver problems.  You are 18 to 79 years old.  You have an HIV infection.  The tests also are done to help your doctor decide about your treatment and see how well it works.  How do you prepare for the test?  In general, there's nothing you have to do before this test, unless your doctor tells you to.  How is the test done?  A health professional uses a needle to take a blood sample, usually from the arm.  What happens after these tests?  You will probably be able to go home right away.  You can go back to your usual activities right away.  Follow-up care is a key part of your treatment and safety. Be sure to make and go to all appointments, and call your doctor if you are having problems. It's also a good idea to keep a list of the medicines you take. Ask your doctor when you can expect to have your test results.  Where can you learn more?  Go to https://[email or web address].net/patiented  Enter W551 in the search box to learn more about \"Hepatitis C Virus Tests: About These Tests.\"  Current as of: June 12, 2023               Content Version: 14.0    5398-7550 [employer].   Care instructions adapted under license by your healthcare professional. If you have questions about a medical condition or this instruction, always ask your healthcare professional. [employer] disclaims any warranty or liability for your use of this information.      Learning About Fetal Ultrasound Results  What is a fetal ultrasound?     Fetal ultrasound is a test that lets your doctor see an image of your baby. Your doctor learns information about your baby from this picture. You may find out, for example, if you are having a boy or a girl. But " the main reason you have this test is to get information about your baby's health.  (You may hear your baby called a fetus. This is a common medical term for a baby that's growing in the mother's uterus.)  What kind of information can you learn from this test?  The findings of an ultrasound fall into two categories, normal and abnormal.  Normal  The fetus is the right size for its age.  The placenta is the expected size and does not cover the cervix.  There is enough amniotic fluid in the uterus.  No birth defects can be seen.  Abnormal  The fetus is small or large for its age.  The placenta covers the cervix.  There is too much or too little amniotic fluid in the uterus.  The fetus may have a birth defect.  What does an abnormal result mean?  Abnormal seems to imply that something is wrong with your baby. But what it means is that the test has shown something the doctor wants to take a closer look at.  And that's what happens next. Your doctor will talk to you about what further test or tests you may need.  What do the results mean?  Some of the things your doctor may see on an abnormal ultrasound include:  Echogenic bowel.  The bowel looks very bright on the screen. This could mean that there's blood in the bowel. Or it could mean that something is blocking the small bowel.  Increased nuchal translucency.  The ultrasound measures the thickness at the back of the baby's neck. An increase in thickness is sometimes an early sign of Down syndrome.  Increased or decreased amniotic fluid.  The doctor will look for a reason for the level of amniotic fluid and will watch the pregnancy closely as it progresses.  Large ventricles.  Ventricles in the brain look larger than they should. Your doctor may take a closer look at the brain.  Renal pyelectasis/hydronephrosis.  The ultrasound measures the fluid around the kidney. If there is more fluid than expected, there is a chance of urinary tract or kidney problems.  Short long  "bones.  The ultrasound measures certain arm and leg bones. A long bone (humerus or femur) that is shorter than average could be a sign of Down syndrome.  Subchorionic hemorrhage.  An ultrasound can show bleeding under one of the membranes that surrounds the fetus. Some women don't have symptoms of bleeding. The ultrasound can find this problem when women are not bleeding from their vagina. Women who have this condition have a slightly higher chance of miscarriage.  What do you do now?  Take a deep breath, and let it out. Keep in mind that an abnormal finding on an ultrasound, after it's coupled with more information, may:  Turn out to be nothing.  Turn out to be something mild that won't affect the baby.  Turn out to be something more serious. But if this happens, early diagnosis helps you and your doctor plan treatment options sooner rather than later.  Your medical team is there for you. So are your family and friends. Ask questions, and get the help and support you need.  Follow-up care is a key part of your treatment and safety. Be sure to make and go to all appointments, and call your doctor if you are having problems. It's also a good idea to know your test results and keep a list of the medicines you take.  Where can you learn more?  Go to https://www.creads.net/patiented  Enter K451 in the search box to learn more about \"Learning About Fetal Ultrasound Results.\"  Current as of: July 10, 2023               Content Version: 14.0    2873-4912 [employer].   Care instructions adapted under license by your healthcare professional. If you have questions about a medical condition or this instruction, always ask your healthcare professional. [employer] disclaims any warranty or liability for your use of this information.      Learning About Prenatal Visits  Overview     Regular prenatal visits are very important during any pregnancy. These quick office visits may seem simple and " routine. But they can help you have a safe and healthy pregnancy. Your doctor is watching for problems that can only be found through regular checkups. The visits also give you and your doctor time to build a good relationship.  After your first visit, you will most likely start on a schedule of monthly visits. In your third trimester, the visits will get more frequent. Based on your health, your age, and if you've had a normal, full-term pregnancy before, your doctor may want to see you more or less often.  At different times in your pregnancy, you will have exams and tests. Some are routine. Others are done only when there is a chance of a problem. Everything healthy you do for your body helps you have a healthy pregnancy. Rest when you need it. Eat well, drink plenty of water, and exercise regularly.  What happens during a prenatal visit?  You will have blood pressure checks, along with urine tests. You also may have blood tests. If you need to go to the bathroom while waiting for the doctor, tell the nurse. You will be given a sample cup so your urine can be tested.  You will be weighed and have your belly measured.  Your doctor may listen to the fetal heartbeat with a special device.  At about 24 weeks, and possibly earlier in your pregnancy, your doctor will check your blood sugar (glucose tolerance test) for diabetes that can occur during pregnancy. This is gestational diabetes, which can be harmful.  You will have tests to check for infections that could harm your . These include group B streptococcus and hepatitis B.  Your doctor may do ultrasounds to check for problems. This also checks the position of the fetus. An ultrasound uses sound waves to produce a picture of the fetus.  You may get your vaccines updated.  Your doctor may ask you questions to check for signs of anxiety or depression. Tell your doctor if you feel sad, anxious, or hopeless for more than a few days.  You may have other tests at  "any time during your pregnancy.  Use your visits to discuss with your doctor any concerns you have.  How can you care for yourself at home?  Get plenty of rest.  Try to exercise every day, if your doctor says it is okay. If you have not exercised in the past, start out slowly. For example, you can take short walks each day.  Choose healthy foods, such as fruits, vegetables, whole grains, lean proteins, low-fat dairy, and healthy fats.  Drink plenty of fluids. Cut down on drinks with caffeine, such as coffee, tea, and cola. If you have kidney, heart, or liver disease and have to limit fluids, talk with your doctor before you increase the amount of fluids you drink.  Try to avoid chemical fumes, paint fumes, and poisons.  If you smoke, vape, or use alcohol, marijuana, or other drugs, quit or cut back as much as you can. Talk to your doctor if you need help quitting.  Review all of your medicines, including over-the-counter medicines and supplements, with your doctor. Some of your routine medicines may need to be changed. Do not stop or start taking any medicines without talking to your doctor first.  Follow-up care is a key part of your treatment and safety. Be sure to make and go to all appointments, and call your doctor if you are having problems. It's also a good idea to know your test results and keep a list of the medicines you take.  Where can you learn more?  Go to https://www.[employer].net/patiented  Enter J502 in the search box to learn more about \"Learning About Prenatal Visits.\"  Current as of: July 10, 2023               Content Version: 14.0    6845-9999 [employer].   Care instructions adapted under license by your healthcare professional. If you have questions about a medical condition or this instruction, always ask your healthcare professional. [employer] disclaims any warranty or liability for your use of this information.      Intimate Partner Violence: Care " Instructions  Overview     If you want to save this information but don't think it is safe to take it home, see if a trusted friend can keep it for you. Plan ahead. Know who you can call for help, and memorize the phone number.   Be careful online too. Your online activity may be seen by others. Do not use your personal computer or device to read about this topic. Use a safe computer, such as one at work, a friend's home, or a library.    Intimate partner violence--a type of domestic abuse--is different from an argument now and then. It is a pattern of abuse that one person may use to control another person's behavior. It may start with threats and name-calling. Then, it may lead to more serious acts, like pushing and slapping. The abuse also may occur in other areas. For example, the abuser may withhold money or spend a partner's money without their knowledge.  Abuse can cause serious harm. You are more likely to have a long-term health problem from the injuries and stress of living in a violent relationship. People who are sexually abused by their partners have more sexually transmitted infections and unplanned pregnancies. Anyone who is abused also faces emotional pain. Anyone can be abused in relationships. In some relationships, both people use abusive behavior.  If you are pregnant, abuse can cause problems such as poor weight gain, infections, and bleeding. Abuse during this time may increase your baby's risk of low birth weight, premature birth, and death.  Follow-up care is a key part of your treatment and safety. Be sure to make and go to all appointments, and call your doctor if you are having problems. It's also a good idea to know your test results and keep a list of the medicines you take.  How can you care for yourself at home?  If you do not have a safe place to stay, discuss this with your doctor before you leave.  Have a plan for where to go, how to leave your home, and where to stay in case of an  "emergency. Do not tell your partner about your plan. Contact:  The National Domestic Violence Hotline toll-free at 1-452.164.6513. They can help you find resources in your area.  Your local police department, hospital, or clinic for information about shelters and safe homes near you.  Talk to a trusted friend or neighbor, a counselor, or a tobi leader. Do not feel that you have to hide what happened.  Teach your children how to call for help in an emergency.  Be alert to warning signs, such as threats, heavy alcohol use, or drug use. This can help you avoid danger.  If you can, make sure that there are no guns or other weapons in your home.  When should you call for help?   Call 911 anytime you think you may need emergency care. For example, call if:    You or someone else has just been abused.     You think you or someone else is in danger of being abused.   Watch closely for changes in your health, and be sure to contact your doctor if you have any problems.  Where can you learn more?  Go to https://www.[employer].net/patiented  Enter G282 in the search box to learn more about \"Intimate Partner Violence: Care Instructions.\"  Current as of: June 24, 2023               Content Version: 14.0    7177-0073 [employer].   Care instructions adapted under license by your healthcare professional. If you have questions about a medical condition or this instruction, always ask your healthcare professional. [employer] disclaims any warranty or liability for your use of this information.      Intimate Partner Violence Safety Instructions: Care Instructions  Overview     If you want to save this information but don't think it is safe to take it home, see if a trusted friend can keep it for you. Plan ahead. Know who you can call for help, and memorize the phone number.   Be careful online too. Your online activity may be seen by others. Do not use your personal computer or device to read about this " topic. Use a safe computer, such as one at work, a friend's home, or a library.    When you are abused by a spouse or partner, you can take actions to protect yourself and your children.  You can increase your safety whether you decide to stay with your spouse or partner or you decide to leave. You may want to make a safety plan and pack a bag ahead of time. This will help you leave quickly when you decide to. Remember, you cannot change your partner's actions, but you can find help for you and your children. No one deserves to be abused.  Follow-up care is a key part of your treatment and safety. Be sure to make and go to all appointments, and call your doctor if you are having problems. It's also a good idea to know your test results and keep a list of the medicines you take.  How can you care for yourself at home?  Make a plan for your safety   If you decide to stay with your abusive spouse or partner, you can do the following to increase your safety:  Decide what works best to keep you safe in an emergency.  Know who you can call to help you in an emergency.  Decide if you will call the police if you get hurt again. If you can, agree on a signal with your children or neighbor to call the police for you if you need help. You can flash lights or hang something out of a window.  Choose a safe place to go for a short time if you need to leave home. Memorize the address and phone number.  Learn escape routes out of your home in case you need to leave in a hurry. Teach your children different ways to get out of your home quickly if they need to.  If you can, hide or lock up things that can be used as weapons (guns, knives, hammers).  Learn the number of a domestic violence shelter. Talk to the people there about how they can help.  Find out about other community resources that can help you.  Take pictures of bruises or other injuries if you can. You can also take pictures of things your abuser has broken.  Teach your  children that violence is never okay. Tell them that they do not deserve to be hurt.  Pack a bag   Prepare a bag with things you will need if you leave suddenly. Leave it with a friend, a relative, or someone else you trust. You could include the following items in the bag:  A set of keys to your home and car.  Emergency phone numbers and addresses.  Money such as cash or checks. You can also ask a friend, a relative, or someone else you trust to hold money for you.  Copies of legal documents such as house and car titles or rent receipts, birth certificates, Social Security card, voter registration, marriage and 's licenses, and your children's health records.  Personal items you would need for a few days, such as clothes, a toothbrush, toothpaste, and any medicines you or your children need.  A favorite toy or book for your child or children.  Diapers and bottles, if you have very young children.  Pictures that show signs of abuse and violence. You may also add pictures of your abuser.  If you leave   If you decide to leave, you can take the following steps:  Go to the emergency room at a hospital if you have been hurt.  Think about asking the police to be with you as you leave. They can protect you as you leave your home.  If you decide to leave secretly, remember that activities can be tracked. Your abuser may still have access to your cell phone, email, and credit cards. It may be possible for these to be traced. Always be aware of your surroundings.  If this is an emergency, do not worry about gathering up anything. Just leave--your safety is most important.  If your abuser moves out, change the locks on the doors. If you have a security system, change the access code.  When should you call for help?   Call 911 anytime you think you may need emergency care. For example, call if:    You or someone else has just been abused.     You think you or someone else is in danger of being abused.   Watch closely for  "changes in your health, and be sure to contact your doctor if you have any problems.  Where can you learn more?  Go to https://www.[employer].net/patiented  Enter A752 in the search box to learn more about \"Intimate Partner Violence Safety Instructions: Care Instructions.\"  Current as of: June 24, 2023               Content Version: 14.0    8974-2648 [employer].   Care instructions adapted under license by your healthcare professional. If you have questions about a medical condition or this instruction, always ask your healthcare professional. [employer] disclaims any warranty or liability for your use of this information.      Learning About Intimate Partner Violence  What is intimate partner violence?  Intimate partner violence is a type of domestic abuse. It's threatening, emotionally harmful, or violent behavior in a personal relationship. It can happen between past or current partners or spouses. In some relationships both people abuse each other. One partner may be more abusive. Or the abuse may be equal.  Abuse can affect people of any ethnic group, race, or Orthodoxy. It can affect teens, adults, or the elderly. And it can happen to people of any sexual orientation, gender, or social status.  Abusers use fear, bullying, and threats to control their partners. They may control what their partners do. They may control where their partners go or who they see. They may act jealous, controlling, or possessive. These early signs of abuse may happen soon after the start of the relationship. Sometimes it can be hard to notice abuse at first. But after the relationship becomes more serious, the abuse may get worse.  If you are being abused in your relationship, it's important to get help. The abuse is not your fault. You don't have to face it alone.  Be careful  It may not be safe to take home domestic abuse information like this handout. Some people ask a trusted friend to keep it for them. " It's also important to plan ahead and to memorize the phone number of places you can go for help. If you are concerned about your safety, do not use your computer, smartphone, or tablet to read about domestic abuse.   What are the types of intimate partner violence?  Abuse can happen in different ways. Each type can happen on its own or in combination with others.  Emotional abuse  Emotional abuse is a pattern of threats, insults, or controlling behavior. It includes verbal abuse. It goes beyond healthy disagreements in a relationship. It's a sign of an unhealthy relationship.  Do you feel threatened, intimidated, or controlled?  Does your partner:  Threaten your children, other family members, or pets?  Use jokes meant to embarrass or shame you?  Call you names?  Tell you that you are a bad parent?  Threaten to take away your children?  Threaten to have you or your family members deported?  Control your access to money or other basic needs?  Control what you do, who you see or talk to, or where you go?  Another form of emotional abuse is denying that it is happening. Or the abuser may act like the abuse is no big deal or is your fault.  Sexual abuse  With sexual abuse, abusers may try to convince or force you to have sex. They may force you into sex acts you're not comfortable with. Or they may sexually assault you. Sexual abuse can happen even if you are in a committed relationship.  Physical abuse  Physical abuse means that a partner hits, kicks, or does something else to physically hurt you. Physical abuse that starts with a slap might lead to kicking, shoving, and choking over time. The abuser may also threaten to hurt or kill you.  Stalking  Stalking means that an abuser gives you attention that you do not want and that causes you fear. Examples of stalking include:  Following you.  Showing up at places where the abuser isn't invited, such as at your work or school.  Constantly calling or texting you.  What  problems can  to?  Intimate partner violence can be very dangerous. It can cause serious, repeated injury. It can even lead to death.  All forms of abuse can cause long-term health problems from the stress of a violent relationship. Verbal abuse can lead to sexual and physical abuse.  Abuse causes:  Emotional pain.  Depression.  Anxiety.  Post-traumatic stress.  Sexual abuse can lead to sexually transmitted infections (such as HIV/AIDS) and unplanned pregnancy.  Pregnancy can be a very dangerous time for people in abusive relationships. Abuse can cause or increase the risk of problems during pregnancy. These include low weight gain, anemia, infections, and bleeding. Abuse may also increase your baby's risk of low birth weight, premature birth, and death.  It can be hard for some victims of abuse to ask for help or to leave their relationship. You may feel scared, stuck, or not sure what steps to take. But it's important not to ignore abuse. Talking to someone you trust could be the first step to ending the abuse and taking care of your own health and happiness again. There are resources available that can help keep you safe.  Where can you get help?  Talk to a trusted friend. Find a local advocacy group, or talk to your doctor about the abuse.  Contact the National Domestic Violence Hotline at 3-397-482-SWYA (1-598.622.8559) for more safety tips. They can guide you to groups in your area that can help. Or go to the National Coalition Against Domestic Violence website at www.[employer].org to learn more.  Domestic violence groups or a counselor in your area can help you make a safety plan for yourself and your children.  When to call for help  Call 911 anytime you think you may need emergency care. For example, call if:  You think that you or someone you know is in danger of being abused.  You have been hurt and can't have someone safely take you to emergency care.  You have just been abused.  A family member  "has just been abused.  Where can you learn more?  Go to https://www.[employer].net/patiented  Enter S665 in the search box to learn more about \"Learning About Intimate Partner Violence.\"  Current as of: June 24, 2023               Content Version: 14.0    7149-1472 [employer].   Care instructions adapted under license by your healthcare professional. If you have questions about a medical condition or this instruction, always ask your healthcare professional. [employer] disclaims any warranty or liability for your use of this information.      Vaginal Bleeding During Pregnancy: Care Instructions  Overview     It's common to have some vaginal spotting when you are pregnant. In some cases, the bleeding isn't serious. And there aren't any more problems with the pregnancy.  But sometimes bleeding is a sign of a more serious problem. This is more common if the bleeding is heavy or painful. Examples of more serious problems include miscarriage, an ectopic pregnancy, and a problem with the placenta.  You may have to see your doctor again to be sure everything is okay. You may also need more tests to find the cause of the bleeding.  Home treatment may be all you need. But it depends on what is causing the bleeding. Be sure to tell your doctor if you have any new symptoms or if your symptoms get worse.  The doctor has checked you carefully, but problems can develop later. If you notice any problems or new symptoms, get medical treatment right away.  Follow-up care is a key part of your treatment and safety. Be sure to make and go to all appointments, and call your doctor if you are having problems. It's also a good idea to know your test results and keep a list of the medicines you take.  How can you care for yourself at home?  If your doctor prescribed medicines, take them exactly as directed. Call your doctor if you think you are having a problem with your medicine.  Do not have vaginal sex until " "your doctor says it's okay.  Do not put anything in your vagina until your doctor says it's okay.  Ask your doctor about other activities you can or can't do.  Get a lot of rest. Being pregnant can make you tired.  Do not use nonsteroidal anti-inflammatory drugs (NSAIDs), such as ibuprofen (Advil, Motrin), naproxen (Aleve), or aspirin, unless your doctor says it is okay.  When should you call for help?   Call 911 anytime you think you may need emergency care. For example, call if:    You passed out (lost consciousness).     You have severe vaginal bleeding. This means you are soaking through a pad each hour for 2 or more hours.     You have sudden, severe pain in your belly or pelvis.   Call your doctor now or seek immediate medical care if:    You have new or worse vaginal bleeding.     You are dizzy or lightheaded, or you feel like you may faint.     You have pain in your belly, pelvis, or lower back.     You think that you are in labor.     You have a sudden release of fluid from your vagina.     You've been having regular contractions for an hour. This means that you've had at least 8 contractions within 1 hour or at least 4 contractions within 20 minutes, even after you change your position and drink fluids.     You notice that your baby has stopped moving or is moving much less than normal.   Watch closely for changes in your health, and be sure to contact your doctor if you have any problems.  Where can you learn more?  Go to https://www.[employer].net/patiented  Enter N829 in the search box to learn more about \"Vaginal Bleeding During Pregnancy: Care Instructions.\"  Current as of: July 10, 2023               Content Version: 14.0    6332-7297 [employer]   Care instructions adapted under license by your healthcare professional. If you have questions about a medical condition or this instruction, always ask your healthcare professional. [employer] disclaims any warranty or " liability for your use of this information.      Weeks 10 to 14 of Your Pregnancy: Care Instructions  It's now possible to hear the fetus's heartbeat with a special ultrasound device. And the fetus's organs are developing.    Decide about tests to check for birth defects. Think about your age, your chance of passing on a family disease, your need to know about any problems, and what you might do after you have the test results.    It's okay to exercise. Try activities such as walking or swimming. Check with your doctor before starting a new program.    You may feel more tired than usual.  Taking naps during the day may help.     You may feel emotional.  It might help to talk to someone.     You may have headaches.  Try lying down and putting a cool cloth over your forehead.     You can use acetaminophen (Tylenol) for pain relief.  Don't take any anti-inflammatory medicines (such as Advil, Motrin, Aleve), unless your doctor says it's okay.     You may feel a fullness or aching in your lower belly.  This can feel like the kind of cramps you might get before a period. A back rub may help.     You may need to urinate more.  Your growing uterus and changing hormones can affect your bladder.     You may feel sick to your stomach (morning sickness).  Try avoiding food and smells that make you feel sick.     Your breasts may feel different.  They may feel tender or get bigger. Your nipples may get darker. Try a bra that gives you good support.     Avoid alcohol, tobacco, and drugs (including marijuana).  If you need help quitting, talk to your doctor.     Take a daily prenatal vitamin.  Choose one with folic acid.   Follow-up care is a key part of your treatment and safety. Be sure to make and go to all appointments, and call your doctor if you are having problems. It's also a good idea to know your test results and keep a list of the medicines you take.  Where can you learn more?  Go to  "https://www.[employer].net/patiented  Enter E090 in the search box to learn more about \"Weeks 10 to 14 of Your Pregnancy: Care Instructions.\"  Current as of: July 10, 2023               Content Version: 14.0    2808-1114 [employer].   Care instructions adapted under license by your healthcare professional. If you have questions about a medical condition or this instruction, always ask your healthcare professional. [employer] disclaims any warranty or liability for your use of this information.        Learning About Pregnancy and Obesity  How does your weight affect your pregnancy?     The basics of prenatal care are the same for everyone, regardless of size. You'll get what you need to have a healthy baby.  But your size can make a difference in a few things. You and your doctor will have to watch your pregnancy weight. Your weight may affect your labor and delivery.  You may have some extra doctor visits and tests. And you may have some tests earlier in your pregnancy. You'll need to pay close attention to things like blood pressure and the chance of getting gestational diabetes. (This is a type of diabetes that sometimes happens during pregnancy.) And close attention will be given to your developing baby.  Work with your doctor to get the care you need. Go to all your doctor visits, and follow your doctor's advice about what to do and what to avoid during pregnancy.  How much weight gain is healthy?  If you are very overweight (obese), experts recommend that you gain between 11 and 20 pounds. Your doctor will work with you to set a weight goal that's right for you. In some cases, your doctor may recommend that you not gain any weight.  How much extra food do you need to eat?  Although you may joke that you're \"eating for two\" during pregnancy, you don't need to eat twice as much food. How much you can eat depends on:  Your height.  How much you weigh when you get pregnant.  How active " "you are.  If you're carrying more than one fetus (multiple pregnancy).  In the first trimester, you'll probably need the same amount of calories as you did before you were pregnant. In general, in your second trimester, you need to eat about 340 extra calories a day. In your third trimester, you need to eat about 450 extra calories a day.  What can you do to have a healthy pregnancy?  The best things you can do for you and your baby are to eat healthy foods, get regular exercise, avoid alcohol and smoking, and go to your doctor visits.  Eat a variety of foods from all the food groups. Make sure to get enough calcium and folic acid.  You may want to work with a dietitian to help you plan healthy meals to get the right amount of calories for you.  If you didn't exercise much before you got pregnant, talk to your doctor about how you can slowly get more active. Your doctor may want to set up an exercise program with you.  Where can you learn more?  Go to https://www.[employer].net/patiented  Enter B644 in the search box to learn more about \"Learning About Pregnancy and Obesity.\"  Current as of: July 10, 2023               Content Version: 14.0    9854-1903 [employer].   Care instructions adapted under license by your healthcare professional. If you have questions about a medical condition or this instruction, always ask your healthcare professional. [employer] disclaims any warranty or liability for your use of this information.      You have been provided the CDC Warning Signs in Pregnancy document.    Additional copies can be found here: www.[employer].com/296655.pdf  "

## 2024-03-29 NOTE — PROGRESS NOTES
"Initial /71 (BP Location: Right arm, Patient Position: Chair, Cuff Size: Adult Regular)   Pulse 76   Temp 98.3  F (36.8  C) (Tympanic)   Resp 16   Ht 1.594 m (5' 2.75\")   Wt 63.7 kg (140 lb 6.4 oz)   LMP 01/16/2024   BMI 25.07 kg/m   Estimated body mass index is 25.07 kg/m  as calculated from the following:    Height as of this encounter: 1.594 m (5' 2.75\").    Weight as of this encounter: 63.7 kg (140 lb 6.4 oz). .    "

## 2024-03-30 LAB — BACTERIA UR CULT: NO GROWTH

## 2024-03-31 LAB
C TRACH DNA SPEC QL PROBE+SIG AMP: NEGATIVE
N GONORRHOEA DNA SPEC QL NAA+PROBE: NEGATIVE

## 2024-04-02 LAB
BKR LAB AP GYN ADEQUACY: NORMAL
BKR LAB AP GYN INTERPRETATION: NORMAL
BKR LAB AP HPV REFLEX: NORMAL
BKR LAB AP PREVIOUS ABNORMAL: NORMAL
PATH REPORT.COMMENTS IMP SPEC: NORMAL
PATH REPORT.COMMENTS IMP SPEC: NORMAL
PATH REPORT.RELEVANT HX SPEC: NORMAL

## 2024-04-04 LAB
HUMAN PAPILLOMA VIRUS 16 DNA: NEGATIVE
HUMAN PAPILLOMA VIRUS 18 DNA: NEGATIVE
HUMAN PAPILLOMA VIRUS FINAL DIAGNOSIS: NORMAL
HUMAN PAPILLOMA VIRUS OTHER HR: NEGATIVE

## 2024-04-07 ENCOUNTER — HEALTH MAINTENANCE LETTER (OUTPATIENT)
Age: 34
End: 2024-04-07

## 2024-04-08 LAB — SCANNED LAB RESULT: NORMAL

## 2024-04-30 ENCOUNTER — PRENATAL OFFICE VISIT (OUTPATIENT)
Dept: OBGYN | Facility: CLINIC | Age: 34
End: 2024-04-30
Payer: COMMERCIAL

## 2024-04-30 VITALS
TEMPERATURE: 97.8 F | RESPIRATION RATE: 16 BRPM | SYSTOLIC BLOOD PRESSURE: 132 MMHG | BODY MASS INDEX: 25.64 KG/M2 | DIASTOLIC BLOOD PRESSURE: 68 MMHG | HEIGHT: 63 IN | HEART RATE: 115 BPM | WEIGHT: 144.7 LBS

## 2024-04-30 DIAGNOSIS — Z34.82 PRENATAL CARE, SUBSEQUENT PREGNANCY IN SECOND TRIMESTER: Primary | ICD-10-CM

## 2024-04-30 PROCEDURE — 99207 PR PRENATAL VISIT: CPT | Performed by: OBSTETRICS & GYNECOLOGY

## 2024-04-30 NOTE — PROGRESS NOTES
"Initial /68 (BP Location: Right arm, Patient Position: Chair, Cuff Size: Adult Regular)   Pulse 115   Temp 97.8  F (36.6  C) (Tympanic)   Resp 16   Ht 1.594 m (5' 2.75\")   Wt 65.6 kg (144 lb 11.2 oz)   LMP 01/16/2024   BMI 25.84 kg/m   Estimated body mass index is 25.84 kg/m  as calculated from the following:    Height as of this encounter: 1.594 m (5' 2.75\").    Weight as of this encounter: 65.6 kg (144 lb 11.2 oz). .    "

## 2024-04-30 NOTE — PROGRESS NOTES
"[facility]   OB/GYN Clinic     CC: F/U OB      Subjective:     Emily is a 33 year old  at 15w0d by Patient's last menstrual period was 2024. who presents for her F/U OB. No n/v. No VB.      Objective:  /68 (BP Location: Right arm, Patient Position: Chair, Cuff Size: Adult Regular)   Pulse 115   Temp 97.8  F (36.6  C) (Tympanic)   Resp 16   Ht 1.594 m (5' 2.75\")   Wt 65.6 kg (144 lb 11.2 oz)   LMP 2024   BMI 25.84 kg/m       Physical Exam:  Gen: Pleasant, talkative female in no apparent distress   Respiratory: Lungs clear, breathing comfortably on room air   Cardiac: Regular rate and rhythm with no murmurs, gallops or rubs. Warm and well-perfused.   GI: Abd soft and non-tender  MSK: Grossly normal movement of all four extremities  Psych: mood and affect bright   Lower extremity: edema not present      SEE OB flowsheet    Assessment/Plan:   33 year old  at 15w0d by LMP of Patient's last menstrual period was 2024. who presents for F/U OB visit.   1) New OB labs. NIPT nl BOY! Declined AFP. Anatomy scan ordered.   2) PMH/OBHx problems:                - hx of c/s for arrest of descent, failed vacuum and fetal intolerance, had successful  and plans to  again               - hx of mild shoulder dystocia: short, no injury to baby, wants to try  again               - hx of readmission for postpartum pre-e, no issues second baby: recommended baseline HELLP labs and ASA this pregnancy               - Hx of genital herpes: only 1x outbreak not in pregnancy, do not discuss in front of partner, recommended 36wk ppx               - UTI this pregnancy; repeat UCx neg  3) Delivery plan: continue to discuss- planning , breastfeeding, pp contraception, pain management in labor - continue to discuss  4) Immunizations: flu shot in the fall, Tdap at 28wks, COVID in the fall     Return to clinic in 4 weeks.      Bee Escalera, " MD  OB/GYN  4/30/2024

## 2025-04-19 ENCOUNTER — HEALTH MAINTENANCE LETTER (OUTPATIENT)
Age: 35
End: 2025-04-19